# Patient Record
Sex: FEMALE | Race: BLACK OR AFRICAN AMERICAN | NOT HISPANIC OR LATINO
[De-identification: names, ages, dates, MRNs, and addresses within clinical notes are randomized per-mention and may not be internally consistent; named-entity substitution may affect disease eponyms.]

---

## 2019-01-16 PROBLEM — Z00.00 ENCOUNTER FOR PREVENTIVE HEALTH EXAMINATION: Status: ACTIVE | Noted: 2019-01-16

## 2019-01-17 ENCOUNTER — FORM ENCOUNTER (OUTPATIENT)
Age: 39
End: 2019-01-17

## 2019-01-18 ENCOUNTER — OUTPATIENT (OUTPATIENT)
Dept: OUTPATIENT SERVICES | Facility: HOSPITAL | Age: 39
LOS: 1 days | End: 2019-01-18
Payer: COMMERCIAL

## 2019-01-18 ENCOUNTER — APPOINTMENT (OUTPATIENT)
Dept: ORTHOPEDIC SURGERY | Facility: CLINIC | Age: 39
End: 2019-01-18
Payer: COMMERCIAL

## 2019-01-18 VITALS
HEIGHT: 64 IN | WEIGHT: 132 LBS | BODY MASS INDEX: 22.53 KG/M2 | SYSTOLIC BLOOD PRESSURE: 140 MMHG | HEART RATE: 77 BPM | OXYGEN SATURATION: 99 % | DIASTOLIC BLOOD PRESSURE: 85 MMHG

## 2019-01-18 PROCEDURE — 72100 X-RAY EXAM L-S SPINE 2/3 VWS: CPT

## 2019-01-18 PROCEDURE — 72082 X-RAY EXAM ENTIRE SPI 2/3 VW: CPT

## 2019-01-18 PROCEDURE — 72100 X-RAY EXAM L-S SPINE 2/3 VWS: CPT | Mod: 26,59

## 2019-01-18 PROCEDURE — 99204 OFFICE O/P NEW MOD 45 MIN: CPT

## 2019-01-18 PROCEDURE — 72082 X-RAY EXAM ENTIRE SPI 2/3 VW: CPT | Mod: 26

## 2019-01-25 NOTE — DISCUSSION/SUMMARY
[de-identified] : Discussed the results of the patient's history, physical exam, and imaging. Ms. Dennis has been suffering from low back pain for years, previously well controlled with physical therapy and steroid injections. Pain began to radiate posteriorly down both thighs and lower legs 3-4 months ago. She also reports numbness in both of her lower extremities and saddle anesthesia over the same time period. Patient has had 2 episodes of urinary incontinence in the last month, denies bowel incontinence. MRI 2017 shows stable L4/5 and L5/S1 disc protrusions, no significant central canal or foraminal stenosis. I am recommending XR thoracolumbar, MRI lumbar and thoracic without contrast for further evaluation.  The patient will follow up with me after imaging is completed, sooner if there is an issue.  All questions were answered.\par \par

## 2019-01-25 NOTE — PHYSICAL EXAM
[de-identified] : General: patient is well developed, well nourished, in no acute \par distress, alert and oriented x 3. \par \par Mood and affect: normal\par \par Respiratory: no respiratory distress noted\par \par Skin: no scars over spine, skin intact, no erythema, increased warmth\par \par Alignment:The spine is well compensated in the coronal and sagittal plane.  There is no asymmetry on the olivas forward bend test\par \par Gait: The patient is able to toe walk and heel walk without difficulty. The patient is able to tandem gait without difficulty.\par \par Palpation: no tenderness to palpation spine or paraspinal region\par \par Range of motion: Lumbar spine ROM is restricted\par \par Neurologic Exam:\par Motor: Manual Muscle testing in the lower extremities is 5 out of 5 in all muscle groups. There is no evidence of muscular atrophy in the lower extremities. Sensory: Sensation to light touch is grossly intact in the lower extremities\par \par Reflexes: DTR are present and symmetric throughout, negative salazar bilaterally, negative inverted radial reflex bilaterally, no clonus, plantar responses are flexor\par \par Hip Exam: No pain with internal or external rotation of hips bilaterally\par \par Special tests: Straight leg raise test negative.  Cross straight leg test negative.  SYDNEE test negative\par \par Vascular: Examination of the peripheral vascular system demonstrates no evidence of congestion or edema. no evidence of lymphedema bilateral lower extremities, pulses are present and symmetric in both lower extremities.\par \par  [de-identified] : XR thoracolumbar 1/18/19: mild multilevel degenerative changes, 2mm retrolisthesis L3 on L4, no evidence of dynamic instability, no scoliosis, no acute fractures\par \par MRI lumbar 2017: L5/S1 stable right paracentral disc protrusion, L4/5 stable central disc protrusion, no significant central or foraminal stenosis, no significant interval change from 2013 imaging

## 2019-01-25 NOTE — HISTORY OF PRESENT ILLNESS
[de-identified] : Ms. BARRETT is a very pleasant 38 year old female who complains of low back pain for years, atraumatic. Pain previously localized to low back, well controlled with physical therapy, steroid injections. Pain began to radiate posteriorly down both thighs and lower legs 3-4 months ago. She also reports paresthesias down both legs in nonspecific distribution and saddle anesthesia. She also reports 2 episodes of urinary incontinence in the past month, denies bowel incontinence. On initial presentation symptoms were as follows: Patient described the pain as intermittent. Patient rated the pain as 7/10 today, average 8/10 this week, 10/10 at its worst. The pain is relieved by nothing in particular.  The pain is worsened by lying down. Past treatments included pharmacologic management, with minimal temporary relief. The patient has not had physical therapy or steroid injections in the last 2 years.\par \par The patient reports no loss of hand dexterity.\par The patient states there no loss of balance when walking.\par \par The patient no history of previous spine surgery.\par The patient no previous spine consultation.\par \par The patient has no history of unexpected weight loss, no history of active cancer, no history bladder or bowel dysfunction, no night pain, no fevers or chills.\par \par The past medical history, surgical history, family history, allergies, medications, 10+ point review of systems, family history and social history were reviewed and non contributory.\par \par

## 2019-01-25 NOTE — REASON FOR VISIT
[Initial Consultation] : an initial consultation for [FreeTextEntry2] : low back pain for years, bilateral leg pain/paresthesias for 3-4 months

## 2019-02-12 ENCOUNTER — APPOINTMENT (OUTPATIENT)
Dept: MRI IMAGING | Facility: HOSPITAL | Age: 39
End: 2019-02-12

## 2019-02-12 ENCOUNTER — OTHER (OUTPATIENT)
Age: 39
End: 2019-02-12

## 2019-02-12 ENCOUNTER — OUTPATIENT (OUTPATIENT)
Dept: OUTPATIENT SERVICES | Facility: HOSPITAL | Age: 39
LOS: 1 days | End: 2019-02-12
Payer: COMMERCIAL

## 2019-02-12 PROCEDURE — 72146 MRI CHEST SPINE W/O DYE: CPT | Mod: 26

## 2019-02-12 PROCEDURE — 72148 MRI LUMBAR SPINE W/O DYE: CPT

## 2019-02-12 PROCEDURE — 72146 MRI CHEST SPINE W/O DYE: CPT

## 2019-02-12 PROCEDURE — 72148 MRI LUMBAR SPINE W/O DYE: CPT | Mod: 26

## 2019-03-05 ENCOUNTER — APPOINTMENT (OUTPATIENT)
Dept: ORTHOPEDIC SURGERY | Facility: CLINIC | Age: 39
End: 2019-03-05

## 2021-03-03 ENCOUNTER — APPOINTMENT (OUTPATIENT)
Dept: NEPHROLOGY | Facility: CLINIC | Age: 41
End: 2021-03-03
Payer: COMMERCIAL

## 2021-03-03 VITALS
HEIGHT: 64 IN | WEIGHT: 150 LBS | BODY MASS INDEX: 25.61 KG/M2 | DIASTOLIC BLOOD PRESSURE: 85 MMHG | SYSTOLIC BLOOD PRESSURE: 126 MMHG | HEART RATE: 80 BPM

## 2021-03-03 DIAGNOSIS — J45.909 UNSPECIFIED ASTHMA, UNCOMPLICATED: ICD-10-CM

## 2021-03-03 DIAGNOSIS — M51.36 OTHER INTERVERTEBRAL DISC DEGENERATION, LUMBAR REGION: ICD-10-CM

## 2021-03-03 DIAGNOSIS — R32 UNSPECIFIED URINARY INCONTINENCE: ICD-10-CM

## 2021-03-03 PROCEDURE — 99072 ADDL SUPL MATRL&STAF TM PHE: CPT

## 2021-03-03 PROCEDURE — 99204 OFFICE O/P NEW MOD 45 MIN: CPT

## 2021-03-03 NOTE — CONSULT LETTER
[Dear  ___] : Dear  [unfilled], [Consult Letter:] : I had the pleasure of evaluating your patient, [unfilled]. [Please see my note below.] : Please see my note below. [Consult Closing:] : Thank you very much for allowing me to participate in the care of this patient.  If you have any questions, please do not hesitate to contact me. [Sincerely,] : Sincerely, [FreeTextEntry2] : Dr Garber [FreeTextEntry3] : Sincerely, \par \par Nicola Armijo MD, FACP

## 2021-03-03 NOTE — ASSESSMENT
[FreeTextEntry1] : 40-year-old woman with a history of hematuria and proteinuria recently, but apparently several times over the years.  The good news is that her kidneys appear to be structurally and functionally normal, based on ultrasound being normal and creatinine and GFR being normal.  I have no information as to confirming the presence of proteinuria, or the magnitude.  The hematuria suggest to me that she may have thin basement membrane, and the prognosis for that is excellent.  If she truly has significant proteinuria several other possibilities might emerge.  I am also concerned as an -American woman at higher risk that her BP has been modestly elevated.  I suggested she cut back on salt intake both to reduce BP and to make edema less of an issue.  I have ordered labs to include BMP, FLOR, IgA level, hepatitis B surface antigen, hepatitis C, urinalysis, urine microalbumin, urine cytology, and urine culture.

## 2021-03-03 NOTE — HISTORY OF PRESENT ILLNESS
[FreeTextEntry1] : 40-year-old woman referred by Dr. Garber because of hematuria/proteinuria.  She has been told several times in the past that she had both, and then developed right flank area pain 11 days ago, went to [facility], and was told she had blood in the urine as well as protein, and was treated with Cipro for UTI.  I have no record of a culture being done.  The hematuria was briefly gross, but is generally microscopic.  She had no fever or chills, no dysuria.  She has had some stress incontinence since  and removal of uterine polyps 2 years ago.  At that time, she lost blood and had 2 blood transfusions, followed by IV iron infusions.  There is been no weight loss.  Her BP has been slightly elevated on numerous occasions : It was 141/85 on a visit in 2019, and 133/91 11 days ago.  There is no personal or family history of hearing disorder.  She has used NSAIDs because of back pain, but her creatinine is 0.76 with a GFR of 115.  A renal ultrasound was just done and was absolutely normal, with no stones, no hydronephrosis , no increased echogenicity.  She briefly noticed bilateral pedal edema which has essentially resolved.

## 2021-03-03 NOTE — PHYSICAL EXAM
[General Appearance - Alert] : alert [General Appearance - In No Acute Distress] : in no acute distress [Sclera] : the sclera and conjunctiva were normal [PERRL With Normal Accommodation] : pupils were equal in size, round, and reactive to light [Outer Ear] : the ears and nose were normal in appearance [Neck Appearance] : the appearance of the neck was normal [Neck Cervical Mass (___cm)] : no neck mass was observed [Jugular Venous Distention Increased] : there was no jugular-venous distention [Auscultation Breath Sounds / Voice Sounds] : lungs were clear to auscultation bilaterally [Heart Rate And Rhythm] : heart rate was normal and rhythm regular [Heart Sounds] : normal S1 and S2 [Heart Sounds Gallop] : no gallops [Murmurs] : no murmurs [Heart Sounds Pericardial Friction Rub] : no pericardial rub [Edema] : there was no peripheral edema [No CVA Tenderness] : no ~M costovertebral angle tenderness [Abnormal Walk] : normal gait [Nail Clubbing] : no clubbing  or cyanosis of the fingernails [Musculoskeletal - Swelling] : no joint swelling seen [Motor Tone] : muscle strength and tone were normal [Skin Color & Pigmentation] : normal skin color and pigmentation [Skin Turgor] : normal skin turgor [] : no rash [Deep Tendon Reflexes (DTR)] : deep tendon reflexes were 2+ and symmetric [No Focal Deficits] : no focal deficits [Oriented To Time, Place, And Person] : oriented to person, place, and time [Impaired Insight] : insight and judgment were intact [Affect] : the affect was normal

## 2021-03-03 NOTE — REVIEW OF SYSTEMS
[Incontinence] : incontinence [Negative] : Heme/Lymph [FreeTextEntry6] : hx asthma - chest tight lately [FreeTextEntry8] : stress [FreeTextEntry9] : back pain

## 2021-12-15 ENCOUNTER — APPOINTMENT (OUTPATIENT)
Dept: NEPHROLOGY | Facility: CLINIC | Age: 41
End: 2021-12-15
Payer: COMMERCIAL

## 2021-12-15 VITALS
SYSTOLIC BLOOD PRESSURE: 122 MMHG | BODY MASS INDEX: 25.61 KG/M2 | HEIGHT: 64 IN | HEART RATE: 76 BPM | DIASTOLIC BLOOD PRESSURE: 80 MMHG | WEIGHT: 150 LBS

## 2021-12-15 DIAGNOSIS — G89.29 LUMBAGO WITH SCIATICA, LEFT SIDE: ICD-10-CM

## 2021-12-15 DIAGNOSIS — R31.9 HEMATURIA, UNSPECIFIED: ICD-10-CM

## 2021-12-15 DIAGNOSIS — Z78.9 OTHER SPECIFIED HEALTH STATUS: ICD-10-CM

## 2021-12-15 DIAGNOSIS — M54.42 LUMBAGO WITH SCIATICA, LEFT SIDE: ICD-10-CM

## 2021-12-15 DIAGNOSIS — R80.9 PROTEINURIA, UNSPECIFIED: ICD-10-CM

## 2021-12-15 DIAGNOSIS — Z80.3 FAMILY HISTORY OF MALIGNANT NEOPLASM OF BREAST: ICD-10-CM

## 2021-12-15 DIAGNOSIS — M54.41 LUMBAGO WITH SCIATICA, LEFT SIDE: ICD-10-CM

## 2021-12-15 PROCEDURE — 99214 OFFICE O/P EST MOD 30 MIN: CPT

## 2021-12-15 NOTE — HISTORY OF PRESENT ILLNESS
[FreeTextEntry1] : 40-year-old woman referred earlier this year by Dr. Garber because of microscopic hematuria and proteinuria.  An ultrasound of the kidneys was normal.  Her renal function has been normal with a creatinine under 0.7 and a GFR of 110 227.  However she had a urine microalbumin of 353 back in March.  Her urinalysis shows 0-2 red cells.  I started her on losartan 50 mg daily in March, and now 9 months later, her urine microalbumin is 136 -a dramatic improvement in the neighborhood of 65% reduction.  She has experienced morning headaches in the last year and a half, since she had an IUD -the question of etiology has been raised and there is been speculation about whether her BP might be responsible.  Her BP was 126/85 here in March.

## 2021-12-15 NOTE — CONSULT LETTER
[Dear  ___] : Dear  [unfilled], [Consult Letter:] : I had the pleasure of evaluating your patient, [unfilled]. [Please see my note below.] : Please see my note below. [Consult Closing:] : Thank you very much for allowing me to participate in the care of this patient.  If you have any questions, please do not hesitate to contact me. [Sincerely,] : Sincerely, [FreeTextEntry2] : Shirlene [FreeTextEntry3] : Sincerely, \par \par Nicola Armijo MD, FACP

## 2021-12-15 NOTE — ASSESSMENT
[FreeTextEntry1] : 41-year-old woman with a history of microscopic hematuria and microalbuminuria with normal renal function and normal-appearing kidneys on ultrasound.  Her microalbuminuria has improved by about 65% on losartan 50 mg daily.  I have thus asked her to increase to 100 mg in an effort to reduce her microalbuminuria even further towards normal.  I have also ordered a 24-hour ambulatory BP monitor in order to assess whether her a.m. BPs are high enough to explain headaches.  She will also be seeing a neurologist to further investigate them.  I will plan to see her back within 6 months and we should repeat a urine microalbumin before then.

## 2021-12-28 ENCOUNTER — TRANSCRIPTION ENCOUNTER (OUTPATIENT)
Age: 41
End: 2021-12-28

## 2022-01-10 ENCOUNTER — APPOINTMENT (OUTPATIENT)
Dept: NEPHROLOGY | Facility: CLINIC | Age: 42
End: 2022-01-10

## 2022-01-24 ENCOUNTER — APPOINTMENT (OUTPATIENT)
Dept: NEPHROLOGY | Facility: CLINIC | Age: 42
End: 2022-01-24
Payer: COMMERCIAL

## 2022-01-24 VITALS — HEART RATE: 68 BPM | SYSTOLIC BLOOD PRESSURE: 132 MMHG | DIASTOLIC BLOOD PRESSURE: 89 MMHG

## 2022-01-24 DIAGNOSIS — R03.0 ELEVATED BLOOD-PRESSURE READING, W/OUT DIAGNOSIS OF HYPERTENSION: ICD-10-CM

## 2022-01-24 PROCEDURE — 93784 AMBL BP MNTR W/SOFTWARE: CPT

## 2022-01-24 NOTE — PROCEDURE
[FreeTextEntry1] : Placed ABPM on left upper arm. Gave instructions for device function and proper fit.\par approx sleep period: 1a-6:30a, again ~8-11a\par current BP meds: losartan 100mg daily

## 2022-01-27 DIAGNOSIS — I10 ESSENTIAL (PRIMARY) HYPERTENSION: ICD-10-CM

## 2022-03-15 ENCOUNTER — NON-APPOINTMENT (OUTPATIENT)
Age: 42
End: 2022-03-15

## 2022-03-15 ENCOUNTER — APPOINTMENT (OUTPATIENT)
Dept: NEUROLOGY | Facility: CLINIC | Age: 42
End: 2022-03-15
Payer: COMMERCIAL

## 2022-03-15 VITALS
SYSTOLIC BLOOD PRESSURE: 146 MMHG | TEMPERATURE: 98.5 F | HEIGHT: 64 IN | OXYGEN SATURATION: 98 % | HEART RATE: 91 BPM | BODY MASS INDEX: 27.31 KG/M2 | WEIGHT: 160 LBS | DIASTOLIC BLOOD PRESSURE: 88 MMHG

## 2022-03-15 DIAGNOSIS — R51.9 HEADACHE, UNSPECIFIED: ICD-10-CM

## 2022-03-15 PROCEDURE — 99205 OFFICE O/P NEW HI 60 MIN: CPT

## 2022-03-15 RX ORDER — LOSARTAN POTASSIUM 100 MG/1
100 TABLET, FILM COATED ORAL DAILY
Refills: 0 | Status: DISCONTINUED | COMMUNITY

## 2022-03-15 RX ORDER — LOSARTAN POTASSIUM 50 MG/1
50 TABLET, FILM COATED ORAL DAILY
Qty: 90 | Refills: 0 | Status: DISCONTINUED | COMMUNITY
Start: 2021-03-16 | End: 2022-03-15

## 2022-03-15 RX ORDER — DIAZEPAM 5 MG/1
5 TABLET ORAL
Qty: 1 | Refills: 0 | Status: DISCONTINUED | COMMUNITY
Start: 2019-02-12 | End: 2022-03-15

## 2022-03-15 RX ORDER — DICLOFENAC SODIUM 75 MG/1
75 TABLET, DELAYED RELEASE ORAL
Qty: 30 | Refills: 0 | Status: DISCONTINUED | COMMUNITY
Start: 2019-01-18 | End: 2022-03-15

## 2022-03-15 NOTE — DISCUSSION/SUMMARY
[FreeTextEntry1] : 41-year-old woman with a history of headaches coincided with her hypertension and diagnosis of microalbuminemia.\par Headaches are usually associated with increased blood pressure which is being apparently consistent through the year.\par \par Logical examination is normal.

## 2022-03-15 NOTE — PHYSICAL EXAM
[Person] : oriented to person [Place] : oriented to place [Time] : oriented to time [Concentration Intact] : normal concentrating ability [Visual Intact] : visual attention was ~T not ~L decreased [Naming Objects] : no difficulty naming common objects [Repeating Phrases] : no difficulty repeating a phrase [Writing A Sentence] : no difficulty writing a sentence [Fluency] : fluency intact [Comprehension] : comprehension intact [Reading] : reading intact [Past History] : adequate knowledge of personal past history [Cranial Nerves Optic (II)] : visual acuity intact bilaterally,  visual fields full to confrontation, pupils equal round and reactive to light [Cranial Nerves Oculomotor (III)] : extraocular motion intact [Cranial Nerves Trigeminal (V)] : facial sensation intact symmetrically [Cranial Nerves Facial (VII)] : face symmetrical [Cranial Nerves Vestibulocochlear (VIII)] : hearing was intact bilaterally [Cranial Nerves Glossopharyngeal (IX)] : tongue and palate midline [Cranial Nerves Accessory (XI - Cranial And Spinal)] : head turning and shoulder shrug symmetric [Cranial Nerves Hypoglossal (XII)] : there was no tongue deviation with protrusion [Motor Tone] : muscle tone was normal in all four extremities [Motor Strength] : muscle strength was normal in all four extremities [No Muscle Atrophy] : normal bulk in all four extremities [Motor Handedness Right-Handed] : the patient is right hand dominant [Sensation Tactile Decrease] : light touch was intact [Abnormal Walk] : normal gait [Balance] : balance was intact [Past-pointing] : there was no past-pointing [Tremor] : no tremor present [2+] : Ankle jerk left 2+ [Plantar Reflex Right Only] : normal on the right [Plantar Reflex Left Only] : normal on the left

## 2022-03-15 NOTE — HISTORY OF PRESENT ILLNESS
[FreeTextEntry1] : First visit of this 41-year-old right-handed  woman was referred by her GP for headaches.\par \par The patient has a history of hematuria proteinuria and is being treated by Dr. Armijo for this.  She also has a history of hypertension secondary to the above. \par The patient is currently on the medication with losartan 100 g a day.\par \par The patient has a history of  and a D&C.  The patient also has foot surgery.\par The patient has no allergies\par \par There is no family history of neurological disorders.\par \par HPI\par \par The patient complains of headaches for about a year.  She was diagnosed with her kidney condition approximately at the same time.\par The headaches have been associated with high blood pressure since then.\par \par She reports that the headaches are almost daily and are particularly in the morning when she wakes up.  They are in the back of the head and on the right side of her head.\par She also has dizzy spells which are not associated necessarily with high blood pressure.\par \par The patient reports that she takes occasionally ibuprofen or Tylenol.\par The patient reports that she has been checking her blood pressure every day and the average systolic pressure is approximately 145.\par \par The patient reports that she has not had any loss of consciousness or any other neurologic symptoms

## 2022-04-12 ENCOUNTER — APPOINTMENT (OUTPATIENT)
Dept: OTOLARYNGOLOGY | Facility: CLINIC | Age: 42
End: 2022-04-12

## 2023-02-21 ENCOUNTER — APPOINTMENT (OUTPATIENT)
Dept: OTOLARYNGOLOGY | Facility: CLINIC | Age: 43
End: 2023-02-21

## 2023-07-25 ENCOUNTER — APPOINTMENT (OUTPATIENT)
Dept: OTOLARYNGOLOGY | Facility: CLINIC | Age: 43
End: 2023-07-25
Payer: COMMERCIAL

## 2023-07-25 VITALS
WEIGHT: 160 LBS | BODY MASS INDEX: 27.31 KG/M2 | HEIGHT: 64 IN | DIASTOLIC BLOOD PRESSURE: 96 MMHG | HEART RATE: 74 BPM | TEMPERATURE: 97.7 F | SYSTOLIC BLOOD PRESSURE: 129 MMHG

## 2023-07-25 DIAGNOSIS — H92.09 OTALGIA, UNSPECIFIED EAR: ICD-10-CM

## 2023-07-25 PROCEDURE — 92557 COMPREHENSIVE HEARING TEST: CPT

## 2023-07-25 PROCEDURE — 92550 TYMPANOMETRY & REFLEX THRESH: CPT | Mod: 52

## 2023-07-25 PROCEDURE — 99203 OFFICE O/P NEW LOW 30 MIN: CPT

## 2023-07-25 NOTE — HISTORY OF PRESENT ILLNESS
[de-identified] : 42F here for initial evaluation.\par \par For the past 6-7 months or so, she c/o ear discomfort, dryness, itching with feeling there is diminished right>left sided hearing. There is no otorrhea, tinnitus or vertigo. She also c/o throat discomfort. There is no difficulty eating, breathing, swallowing or talking.\par \par Audiogram from today:\par -hearing symmetric and wnl\par -SRT 15, 100%\par -R type A, L type As\par \par ROS otherwise unremarkable

## 2023-07-25 NOTE — PHYSICAL EXAM
[de-identified] : Au: EAC clear, TM intact and mobile, ME clear [Midline] : trachea located in midline position [de-identified] : posterior oropharynx widely patent [Normal] : no rashes

## 2023-07-25 NOTE — ASSESSMENT
[FreeTextEntry1] : 42F here for initial evaluation. For the past 6-7 months or so, she c/o ear discomfort, dryness, itching with feeling there is diminished right>left sided hearing. There is no otorrhea, tinnitus or vertigo. She also c/o throat discomfort. There is no difficulty eating, breathing, swallowing or talking. Audiogram from today is normal, as above. Complete and comprehensive head and neck exam is unremarkable.\par Despite her complaints, exam and audiometry are both normal. Pt reassured. Advised to RTO when actively symptomatic for exam at that time.